# Patient Record
Sex: MALE | Race: WHITE | NOT HISPANIC OR LATINO | Employment: FULL TIME | URBAN - METROPOLITAN AREA
[De-identification: names, ages, dates, MRNs, and addresses within clinical notes are randomized per-mention and may not be internally consistent; named-entity substitution may affect disease eponyms.]

---

## 2017-10-30 ENCOUNTER — ALLSCRIPTS OFFICE VISIT (OUTPATIENT)
Dept: OTHER | Facility: OTHER | Age: 47
End: 2017-10-30

## 2017-10-30 LAB
BILIRUB UR QL STRIP: NORMAL
CLARITY UR: NORMAL
COLOR UR: YELLOW
GLUCOSE (HISTORICAL): NORMAL
HGB UR QL STRIP.AUTO: NORMAL
KETONES UR STRIP-MCNC: NORMAL MG/DL
LEUKOCYTE ESTERASE UR QL STRIP: NORMAL
NITRITE UR QL STRIP: NORMAL
PH UR STRIP.AUTO: 7 [PH]
PROT UR STRIP-MCNC: NORMAL MG/DL
SP GR UR STRIP.AUTO: 1.02
UROBILINOGEN UR QL STRIP.AUTO: 0.2

## 2018-01-04 ENCOUNTER — GENERIC CONVERSION - ENCOUNTER (OUTPATIENT)
Dept: OTHER | Facility: OTHER | Age: 48
End: 2018-01-04

## 2018-01-11 NOTE — RESULT NOTES
Message   please call patient and let him know his biopsy result are benign  should keep the appointment with Mckenna to discuss change in bowel habits  Verified Results  (1) TISSUE EXAM [id number] 01:55PM Vinita Grimes     Test Name Result Flag Reference   LAB AP CASE REPORT (Report)     Surgical Pathology Report             Case: [id number]                   Authorizing Provider: Alexi Henderson MD       Collected:      02/25/2016 1355        Ordering Location:   [address] [place] [address]    Received:      02/26/2016 [address] [facility]                            Pathologist:      Leonora Haskins MD                              Specimen:  Large Intestine, Transverse Colon, transverse colon polyp 4mm cold bx   LAB AP FINAL DIAGNOSIS (Report)     A  Large Intestine, Transverse Colon, transverse colon polyp 4mm cold bx:        - Tubular adenoma in 2 of multiple tissue fragments        - Additional fragments of colonic mucosa with no significant   pathologic alteration         - No high-grade dysplasia or malignancy is identified  LAB AP SURGICAL ADDITIONAL INFORMATION (Report)     These tests were developed and their performance characteristics   determined by [address] [place] [address] or [facility]  They may not be cleared or approved by the [place]  Food and   Drug Administration  The FDA has determined that such clearance or   approval is not necessary  These tests are used for clinical purposes  They should not be regarded as investigational or for research  This   laboratory has been approved by CLIA 88, designated as a high-complexity   laboratory and is qualified to perform these tests  LAB AP GROSS DESCRIPTION (Report)     A  The specimen is received in formalin, labeled with the patient's name   and hospital number, and is designated transverse colon polyp, 4   millimeter   The specimen consists of 5 tan-pink soft tissue fragments   ranging from 0 2 centimeters to 0 5 centimeters in greatest dimension  Entirely submitted  One cassette  Note: The estimated total formalin fixation time based upon information   provided by the submitting clinician and the standard processing schedule   is 38 5 hours  MAS   LAB AP CLINICAL INFORMATION      Change in bowel habits   [R19 4]

## 2018-01-14 VITALS
WEIGHT: 184 LBS | SYSTOLIC BLOOD PRESSURE: 127 MMHG | DIASTOLIC BLOOD PRESSURE: 79 MMHG | BODY MASS INDEX: 24.39 KG/M2 | HEIGHT: 73 IN

## 2018-01-16 NOTE — MISCELLANEOUS
Message  Return to work or school:   Mor Pickett is under my professional care   He was seen in my office on 2/25/16   He is able to return to work on  2/26/16            Signatures   Electronically signed by : Alexi Henderson MD; Feb 25 2016  2:18PM EST                       (Author)

## 2018-01-23 NOTE — MISCELLANEOUS
Message  GI Reminder Recall [address] [place] [address] [address]:   Date: 01/04/2018   Dear Alda Bruce:     Review of our records shows you are due for the following: Colonoscopy  Our records indicate that you are due at this time to have a follow-up examination for a colonoscopy  As you now, these tests are done to prevent colon cancer, a very common disease in the [place] and responsible for the thousands of patient deaths each year  We at [facility] are concerned for your health, and would very much appreciate you getting in touch with us at your earliest convenience, Again, this examination is vital to your proper health maintenance and for the prevention of cancer  Please call the following office to schedule your appointment:   [address], [address], [place], [address] [facility] [phone]  We look forward to hearing from you!      Sincerely,     [facility]      Signatures   Electronically signed by : Jose Antonio Jalloh, ; Jan 4 2018  2:33PM EST                       (Author)

## 2018-04-20 ENCOUNTER — TELEPHONE (OUTPATIENT)
Dept: GASTROENTEROLOGY | Facility: CLINIC | Age: 48
End: 2018-04-20

## 2018-04-25 DIAGNOSIS — R19.8 CHANGE IN BOWEL FUNCTION: Primary | ICD-10-CM

## 2018-04-25 PROBLEM — Z86.010 HISTORY OF COLON POLYPS: Status: ACTIVE | Noted: 2018-04-25

## 2018-04-25 PROBLEM — Z86.0100 HISTORY OF COLON POLYPS: Status: ACTIVE | Noted: 2018-04-25

## 2018-04-25 NOTE — TELEPHONE ENCOUNTER
Please send suprep to pharmacy on file   Thanks       Pt is sched for repeat colon with dr Familia Ramsey on 05/07/2018 at [facility] pt instructions to suprep and procedure pt is aware he will get a call the day before with time  Email: [email or web address]

## 2018-05-04 ENCOUNTER — ANESTHESIA EVENT (OUTPATIENT)
Dept: GASTROENTEROLOGY | Facility: MEDICAL CENTER | Age: 48
End: 2018-05-04
Payer: COMMERCIAL

## 2018-05-07 ENCOUNTER — ANESTHESIA (OUTPATIENT)
Dept: GASTROENTEROLOGY | Facility: MEDICAL CENTER | Age: 48
End: 2018-05-07
Payer: COMMERCIAL

## 2018-05-07 ENCOUNTER — HOSPITAL ENCOUNTER (OUTPATIENT)
Facility: MEDICAL CENTER | Age: 48
Setting detail: OUTPATIENT SURGERY
Discharge: HOME | End: 2018-05-07
Attending: INTERNAL MEDICINE | Admitting: INTERNAL MEDICINE
Payer: COMMERCIAL

## 2018-05-07 VITALS
HEIGHT: 73 IN | DIASTOLIC BLOOD PRESSURE: 73 MMHG | WEIGHT: 187 LBS | RESPIRATION RATE: 18 BRPM | TEMPERATURE: 97.7 F | BODY MASS INDEX: 24.78 KG/M2 | SYSTOLIC BLOOD PRESSURE: 112 MMHG | OXYGEN SATURATION: 97 % | HEART RATE: 63 BPM

## 2018-05-07 DIAGNOSIS — Z86.010 HISTORY OF COLON POLYPS: ICD-10-CM

## 2018-05-07 PROCEDURE — 45380 COLONOSCOPY AND BIOPSY: CPT | Performed by: INTERNAL MEDICINE

## 2018-05-07 PROCEDURE — 88305 TISSUE EXAM BY PATHOLOGIST: CPT | Performed by: PATHOLOGY

## 2018-05-07 RX ORDER — EMTRICITABINE AND TENOFOVIR DISOPROXIL FUMARATE 100; 150 MG/1; MG/1
TABLET, FILM COATED ORAL
COMMUNITY

## 2018-05-07 RX ORDER — PROPOFOL 10 MG/ML
INJECTION, EMULSION INTRAVENOUS AS NEEDED
Status: DISCONTINUED | OUTPATIENT
Start: 2018-05-07 | End: 2018-05-07 | Stop reason: SURG

## 2018-05-07 RX ORDER — LIDOCAINE HYDROCHLORIDE 20 MG/ML
INJECTION, SOLUTION EPIDURAL; INFILTRATION; INTRACAUDAL; PERINEURAL AS NEEDED
Status: DISCONTINUED | OUTPATIENT
Start: 2018-05-07 | End: 2018-05-07 | Stop reason: SURG

## 2018-05-07 RX ORDER — SODIUM CHLORIDE 9 MG/ML
125 INJECTION, SOLUTION INTRAVENOUS CONTINUOUS
Status: DISCONTINUED | OUTPATIENT
Start: 2018-05-07 | End: 2018-05-07 | Stop reason: HOSPADM

## 2018-05-07 RX ORDER — PROPOFOL 10 MG/ML
INJECTION, EMULSION INTRAVENOUS CONTINUOUS PRN
Status: DISCONTINUED | OUTPATIENT
Start: 2018-05-07 | End: 2018-05-07 | Stop reason: SURG

## 2018-05-07 RX ADMIN — LIDOCAINE HYDROCHLORIDE 60 MG: 20 INJECTION, SOLUTION EPIDURAL; INFILTRATION; INTRACAUDAL; PERINEURAL at 14:29

## 2018-05-07 RX ADMIN — SODIUM CHLORIDE 125 ML/HR: 0.9 INJECTION, SOLUTION INTRAVENOUS at 13:59

## 2018-05-07 RX ADMIN — PROPOFOL 100 MG: 10 INJECTION, EMULSION INTRAVENOUS at 14:29

## 2018-05-07 RX ADMIN — PROPOFOL 160 MCG/KG/MIN: 10 INJECTION, EMULSION INTRAVENOUS at 14:29

## 2018-05-07 NOTE — ANESTHESIA PREPROCEDURE EVALUATION
Review of Systems/Medical History  Patient summary reviewed  Chart reviewed      Cardiovascular  Negative cardio ROS    Pulmonary  Smoker ex-smoker  ,        GI/Hepatic    Bowel prep            Endo/Other  Negative endo/other ROS      GYN       Hematology  Negative hematology ROS      Musculoskeletal  Negative musculoskeletal ROS        Neurology  Negative neurology ROS      Psychology   Negative psychology ROS              Physical Exam    Airway    Mallampati score: I  TM Distance: <3 FB       Dental   No notable dental hx     Cardiovascular  Comment: Negative ROS, Rhythm: regular, Rate: normal, Cardiovascular exam normal    Pulmonary  Pulmonary exam normal     Other Findings        Anesthesia Plan  ASA Score- 2     Anesthesia Type- IV sedation with anesthesia with ASA Monitors  Additional Monitors:   Airway Plan:         Plan Factors- Patient instructed to abstain from smoking on day of procedure  Patient did not smoke on day of surgery  Induction- intravenous  Postoperative Plan-     Informed Consent- Anesthetic plan and risks discussed with patient

## 2018-05-07 NOTE — DISCHARGE INSTRUCTIONS
Colonoscopy   WHAT YOU NEED TO KNOW:   A colonoscopy is a procedure to examine the inside of your colon (intestine) with a scope  Polyps or tissue growths may have been removed during your colonoscopy  It is normal to feel bloated and to have some abdominal discomfort  You should be passing gas  If you have hemorrhoids or you had polyps removed, you may have a small amount of bleeding  DISCHARGE INSTRUCTIONS:   Seek care immediately if:   · You have a large amount of bright red blood in your bowel movements  · Your abdomen is hard and firm and you have severe pain  · You have sudden trouble breathing  Contact your healthcare provider if:   · You develop a rash or hives  · You have a fever within 24 hours of your procedure  · You have not had a bowel movement for 3 days after your procedure  · You have questions or concerns about your condition or care  Activity:   · Do not lift, strain, or run  for 3 days after your procedure  · Rest after your procedure  You have been given medicine to relax you  Do not  drive or make important decisions until the day after your procedure  Return to your normal activity as directed  · Relieve gas and discomfort from bloating  by lying on your right side with a heating pad on your abdomen  You may need to take short walks to help the gas move out  Eat small meals until bloating is relieved  If you had polyps removed: For 7 days after your procedure:  · Do not  take aspirin  · Do not  go on long car rides  Help prevent constipation:   · Eat a variety of healthy foods  Healthy foods include fruit, vegetables, whole-grain breads, low-fat dairy products, beans, lean meat, and fish  Ask if you need to be on a special diet  Your healthcare provider may recommend that you eat high-fiber foods such as cooked beans  Fiber helps you have regular bowel movements  · Drink liquids as directed    Adults should drink between 9 and 13 eight-ounce cups of liquid every day  Ask what amount is best for you  For most people, good liquids to drink are water, juice, and milk  · Exercise as directed  Talk to your healthcare provider about the best exercise plan for you  Exercise can help prevent constipation, decrease your blood pressure and improve your health  Follow up with your healthcare provider as directed:  Write down your questions so you remember to ask them during your visits  © 2017 [employer] [employer] Information is for End User's use only and may not be sold, redistributed or otherwise used for commercial purposes  All illustrations and images included in CareNotes® are the copyrighted property of A D A M , Inc  or Aditya Pedersen  The above information is an  only  It is not intended as medical advice for individual conditions or treatments  Talk to your doctor, nurse or pharmacist before following any medical regimen to see if it is safe and effective for you  Colorectal Polyps   WHAT YOU NEED TO KNOW:   Colorectal polyps are small growths of tissue in the lining of the colon and rectum  Most polyps are hyperplastic polyps and are usually benign (noncancerous)  Certain types of polyps, called adenomatous polyps, may turn into cancer  DISCHARGE INSTRUCTIONS:   Follow up with your healthcare provider or gastroenterologist as directed: You may need to return for more tests, such as another colonoscopy  Write down your questions so you remember to ask them during your visits  Reduce your risk for colorectal polyps:   · Eat a variety of healthy foods:  Healthy foods include fruit, vegetables, whole-grain breads, low-fat dairy products, beans, lean meat, and fish  Ask if you need to be on a special diet  · Maintain a healthy weight:  Ask your healthcare provider if you need to lose weight and how much you need to lose   Ask for help with a weight loss program     · Exercise:  Begin to exercise slowly and do more as you get stronger  Talk with your healthcare provider before you start an exercise program      · Limit alcohol:  Your risk for polyps increases the more you drink  · Do not smoke: If you smoke, it is never too late to quit  Ask for information about how to stop  For support and more information:   · Zuleyka Kuhn ([facility])  [address] , [place] [address]  Phone: [phone]  Web Address: www digestive  niddk nih gov  Contact your healthcare provider or gastroenterologist if:   · You have a fever  · You have chills, a cough, or feel weak and achy  · You have abdominal pain that does not go away or gets worse after you take medicine  · Your abdomen is swollen  · You are losing weight without trying  · You have questions or concerns about your condition or care  Seek care immediately or call 911 if:   · You have sudden shortness of breath  · You have a fast heart rate, fast breathing, or are too dizzy to stand up  · You have severe abdominal pain  · You see blood in your bowel movement  © 2017 [facility] [facility] Information is for End User's use only and may not be sold, redistributed or otherwise used for commercial purposes  All illustrations and images included in CareNotes® are the copyrighted property of A D A M , Inc  or [address]  The above information is an  only  It is not intended as medical advice for individual conditions or treatments  Talk to your doctor, nurse or pharmacist before following any medical regimen to see if it is safe and effective for you

## 2018-05-07 NOTE — OP NOTE
**** GI/ENDOSCOPY REPORT ****     PATIENT NAME: Promise Ortiz ------ VISIT ID:  Patient ID: [id number]   YOB: 1970     INTRODUCTION: Colonoscopy - A 41 male patient presents for an outpatient   Colonoscopy at [address] [place] [address]  PREVIOUS COLONOSCOPY:     INDICATIONS: Surveillance  CONSENT:  The benefits, risks, and alternatives to the procedure were   discussed and informed consent was obtained from the patient  PREPARATION: EKG, pulse, pulse oximetry and blood pressure were monitored   throughout the procedure  The patient was identified by myself both   verbally and by visual inspection of ID band  Airway Assessment   Classification: Airway class 2 - Visualization of the soft palate, fauces   and uvula  ASA Classification: See anesthesia record  MEDICATIONS: Anesthesia-check records MAC anesthesia  PROCEDURE:  The endoscope was passed without difficulty through the anus   under direct visualization and advanced to the cecum, confirmed by   appendiceal orifice and ileocecal valve  The scope was withdrawn and the   mucosa was carefully examined  The quality of the preparation was good  intubation time: 4 minutes  withdrawl 11 minutes  RECTAL EXAM: Normal rectal exam      FINDINGS:  A single diminutive polyp was found in the transverse colon  The polyp was completely removed by cold biopsy polypectomy  Otherwise,   the colon appeared to be normal  Small hemorrhoids during retroflex  COMPLICATIONS: There were no complications  IMPRESSIONS: A single diminutive polyp found in the transverse colon;   removed by cold biopsy polypectomy  RECOMMENDATIONS: Colonoscopy recommended in 5 years  follow up biopsy   result  ESTIMATED BLOOD LOSS:     PATHOLOGY SPECIMENS: Completely removed by cold biopsy polypectomy       PROCEDURE CODES:     ICD-9 Codes: 211 3 Benign neoplasm of colon     ICD-10 Codes: K63 5 Polyp of colon     PERFORMED BY:   DANIELLE Ortega  on  Version 1, electronically signed by DANIELLE Gordon  on 05/07/2018 at   14:51

## 2018-05-11 ENCOUNTER — TELEPHONE (OUTPATIENT)
Dept: GASTROENTEROLOGY | Facility: MEDICAL CENTER | Age: 48
End: 2018-05-11

## 2018-05-11 NOTE — TELEPHONE ENCOUNTER
----- Message from Evelena Phalen, MD sent at 5/10/2018  2:32 PM EDT -----  Polyp removed was precancerous  Repeat colonoscopy in 5 years  Please enter reminder

## 2020-10-22 ENCOUNTER — NURSE TRIAGE (OUTPATIENT)
Dept: OTHER | Facility: OTHER | Age: 50
End: 2020-10-22

## 2020-10-22 DIAGNOSIS — Z11.59 SPECIAL SCREENING EXAMINATION FOR UNSPECIFIED VIRAL DISEASE: Primary | ICD-10-CM

## 2020-10-23 DIAGNOSIS — Z11.59 SPECIAL SCREENING EXAMINATION FOR UNSPECIFIED VIRAL DISEASE: ICD-10-CM

## 2020-10-23 PROCEDURE — U0003 INFECTIOUS AGENT DETECTION BY NUCLEIC ACID (DNA OR RNA); SEVERE ACUTE RESPIRATORY SYNDROME CORONAVIRUS 2 (SARS-COV-2) (CORONAVIRUS DISEASE [COVID-19]), AMPLIFIED PROBE TECHNIQUE, MAKING USE OF HIGH THROUGHPUT TECHNOLOGIES AS DESCRIBED BY CMS-2020-01-R: HCPCS | Performed by: FAMILY MEDICINE

## 2020-10-24 LAB — SARS-COV-2 RNA SPEC QL NAA+PROBE: NOT DETECTED

## 2023-04-30 LAB — PSA SERPL-MCNC: 4.77 NG/ML

## 2023-05-03 ENCOUNTER — TELEPHONE (OUTPATIENT)
Dept: OTHER | Facility: OTHER | Age: 53
End: 2023-05-03

## 2023-05-03 DIAGNOSIS — R97.20 ELEVATED PSA: Primary | ICD-10-CM

## 2023-05-03 NOTE — TELEPHONE ENCOUNTER
PSA remains elevated  Per last office visit, MRI was ordered  Please let patient know to go for this and office follow up to discuss results once completed   Thanks

## 2023-05-03 NOTE — TELEPHONE ENCOUNTER
Spoke with patient and relayed provider's plan  He was provided with central scheduling number to schedule MRI prostate  Advised him to then call here and schedule an appt about a week after the MRI to review the results  Patient verbalized understanding

## 2023-05-03 NOTE — TELEPHONE ENCOUNTER
Patient calling regarding lab results  It does not appear that a provider advised on them yet  Please call patient regarding results

## 2023-05-05 ENCOUNTER — TELEPHONE (OUTPATIENT)
Dept: OTHER | Facility: OTHER | Age: 53
End: 2023-05-05

## 2023-05-05 NOTE — TELEPHONE ENCOUNTER
May 5, 2023       CLARISSE    8:55 AM  Helio Lynch        8:57 AM  Note   Patient called today regarding his MRI Appointment is May 16 at 4:15 pm

## 2023-05-16 ENCOUNTER — HOSPITAL ENCOUNTER (OUTPATIENT)
Facility: MEDICAL CENTER | Age: 53
Discharge: HOME | End: 2023-05-16

## 2023-05-16 DIAGNOSIS — R97.20 ELEVATED PSA: ICD-10-CM

## 2023-05-16 RX ADMIN — GADOBUTROL 10 ML: 604.72 INJECTION INTRAVENOUS at 16:52

## 2023-05-18 ENCOUNTER — TELEPHONE (OUTPATIENT)
Dept: OTHER | Facility: OTHER | Age: 53
End: 2023-05-18

## 2023-05-18 NOTE — TELEPHONE ENCOUNTER
Pt received his results on Mychart and is anxious  He wants to know if the Dr has any openings to discuss his results today or tomorrow instead of his apt on 05/22  Please call and advise

## 2023-05-22 ENCOUNTER — OFFICE VISIT (OUTPATIENT)
Dept: UROLOGY | Facility: CLINIC | Age: 53
End: 2023-05-22

## 2023-05-22 VITALS
OXYGEN SATURATION: 94 % | HEART RATE: 108 BPM | HEIGHT: 73 IN | SYSTOLIC BLOOD PRESSURE: 120 MMHG | BODY MASS INDEX: 29.29 KG/M2 | WEIGHT: 221 LBS | DIASTOLIC BLOOD PRESSURE: 80 MMHG

## 2023-05-22 DIAGNOSIS — R97.20 ELEVATED PSA: Primary | ICD-10-CM

## 2023-05-22 DIAGNOSIS — R35.1 NOCTURIA: ICD-10-CM

## 2023-05-22 NOTE — ASSESSMENT & PLAN NOTE
The patient is primarily bothered by nocturia but is having large volume voids each time he goes overnight which suggests he may have nocturnal polyuria or may be related to another issue such as sleep apnea  He does snore per his partner  I asked him to get a sleep study done through his PCP    If this is unremarkable then would consider anticholinergic medication

## 2023-05-22 NOTE — PROGRESS NOTES
Assessment/Plan:    Nocturia  The patient is primarily bothered by nocturia but is having large volume voids each time he goes overnight which suggests he may have nocturnal polyuria or may be related to another issue such as sleep apnea  He does snore per his partner  I asked him to get a sleep study done through his PCP  If this is unremarkable then would consider anticholinergic medication    Elevated PSA  We discussed the patient's mildly elevated PSA and MRI showing a PI-RADS 3 lesion  Discussed implications of this  Given his young age I think it is reasonable to move forward MRI guided fusion biopsy but not unreasonable to repeat a PSA  He wants to move forward with MRI guided fusion biopsy  He is not on blood thinners  I will see him back after biopsy to go over results  Subjective:      Patient ID: Michelle Lira is a 50 y o  male  HPI    Michelle Lira is a 50 y o  male with elevated PSA  The patient's PSA was 4 7 in April 2023  He apparently had an outside transrectal prostate ultrasound showing a 36 cc prostate with no suspicious masses  He elected for an MRI of the prostate which showed a PI-RADS 3 lesion at the right peripheral zone near the apex  JANE at prior visit showed asymmetry in the right side  He complains of urinary frequency every hour to hour and a half during the day and up to 4-5 times per night  He was put on Levaquin and tamsulosin by his primary care provider  He reports no difference in his symptoms with the Flomax  He continues to report issues primarily with nocturia  He denies any family history of prostate bladder or kidney diseases  Past Surgical History:   Procedure Laterality Date   • COLONOSCOPY N/A 2/25/2016    Procedure: COLONOSCOPY;  Surgeon: Cralee Hale MD;  Location: [facility]; Service:    • COLONOSCOPY N/A 5/7/2018    Procedure: COLONOSCOPY;  Surgeon: Carlee Hale MD;  Location: [facility];   Service: Gastroenterology "  • HERNIA REPAIR      infant        Past Medical History:   Diagnosis Date   • Allergic rhinitis    • Colon polyps    • Diarrhea     along with difficulty evacuating stool at times  pt states it is like something clamps down during evacuation and cant release        AUA SYMPTOM SCORE    Flowsheet Row Most Recent Value   AUA SYMPTOM SCORE    How often have you had a sensation of not emptying your bladder completely after you finished urinating? 5 (P)     How often have you had to urinate again less than two hours after you finished urinating? 3 (P)     How often have you found you stopped and started again several times when you urinate? 4 (P)     How often have you found it difficult to postpone urination? 4 (P)     How often have you had a weak urinary stream? 0 (P)     How often have you had to push or strain to begin urination? 0 (P)     How many times did you most typically get up to urinate from the time you went to bed at night until the time you got up in the morning? 3 (P)     Quality of Life: If you were to spend the rest of your life with your urinary condition just the way it is now, how would you feel about that? 4 (P)     AUA SYMPTOM SCORE 19 (P)            Review of Systems   Constitutional: Negative for chills and fever  HENT: Negative for ear pain and sore throat  Eyes: Negative for pain and visual disturbance  Respiratory: Negative for cough and shortness of breath  Cardiovascular: Negative for chest pain and palpitations  Gastrointestinal: Negative for abdominal pain and vomiting  Genitourinary: Negative for dysuria and hematuria  Musculoskeletal: Negative for arthralgias and back pain  Skin: Negative for color change and rash  Neurological: Negative for seizures and syncope  All other systems reviewed and are negative          Objective:      /80 (BP Location: Left arm, Patient Position: Sitting, Cuff Size: Standard)   Pulse (!) 108   Ht 6' 1\" (1 854 m)   Wt 100 kg " (221 lb)   SpO2 94%   BMI 29 16 kg/m²     Lab Results   Component Value Date    PSA 4 77 (H) 04/29/2023          Physical Exam  Vitals reviewed  Constitutional:       Appearance: Normal appearance  He is normal weight  HENT:      Head: Normocephalic and atraumatic  Eyes:      Pupils: Pupils are equal, round, and reactive to light  Abdominal:      General: Abdomen is flat  Neurological:      General: No focal deficit present  Mental Status: He is alert and oriented to person, place, and time  Psychiatric:         Mood and Affect: Mood normal          Thought Content: Thought content normal            Narrative & Impression   MULTIPARAMETRIC MRI OF THE PROSTATE WITH AND WITHOUT CONTRAST-WITH 3-D POSTPROCESSING      INDICATION:   R97 20: Elevated prostate specific antigen (PSA)  61-year-old male with elevated PSA      COMPARISON: Transrectal prostate ultrasound from April 10, 2023      PSA LEVEL: 4 77 ng/ml  (4/29/2023)  PRIORS: None  PRIOR BIOPSY DATE: No prior biopsy  BIOPSY RESULTS: Not applicable      TECHNIQUE: The following pulse sequences were obtained:  Small field-of-view axial T1-weighted and multiplanar T2-weighted images; DWI axial and ADC map; large field of view axial T2 weighted images; T1w in-phase and opposed-phase axials of entire pelvis   and dynamic 3D T1w axial before and during IV contrast injection  Imaging performed on 3 0T MRI     Prostate gland boundaries were segmented using advanced 3D post-processing on an independent workstation ([employer]) with active physician participation      CONTRAST:  Gadobutrol (Gadavist) 10 mL of Gadobutrol injection (SINGLE-DOSE)     TECHNICAL LIMITATIONS: None      FINDINGS:     PROSTATE:  Size: 4 9 x 4 2 x 4 1 cm  Prostate volume: 48 mL  PSA density: 0 10 ng/mL2  POST-BIOPSY HEMORRHAGE: Not applicable  PERIPHERAL ZONE: Lesion at the apex described below  TRANSITION ZONE: Moderate BPH  No atypical nodules   No suspicious T2 hypointense foci   CENTRAL ZONE: Normal   ANTERIOR FIBROMUSCULAR STROMA:  Normal      FOCAL LESIONS:     Lesion: 1  Size: 1 1 x 0 9 x 0 6 cm, 0 4 ml  Location: Right anterior peripheral zone at the apex  T2-weighted images: Score 4: Circumscribed, homogeneous moderate hypointense focus/mass confined to prostate and less than 1 5 cm in greatest dimension  Diffusion-weighted images: Score 3: Focal (discrete and different from the background) hypointense on ADC and/or focal hyperintense on high b-value DWI; maybe markedly hypointese on ADC or markedly hyperintese on high b-value DWI, but not both  Dynamic post-contrast images: (-) Lesion that does not enhance early compared to the surrounding prostate or enhances diffusely so that the margins of the enhancing area do not correspond to a finding on T2-weighted images and/or DWI  PI-RADS Assessment Category: 3, Intermediate (presence of clinically significant cancer equivocal)  Extra-prostatic extension (EPE): Abuts capsule without visualized EPE      SEMINAL VESICLES:  Unremarkable     URINARY BLADDER: Unremarkable      LYMPH NODES: No pelvic lymphadenopathy      BONES: Normal marrow signal  No suspicious bony lesion      OTHER:  Vessels:  Normal   Imaged bowel: Sigmoid diverticulosis without evidence of diverticulitis  Additional findings:  None        IMPRESSION:     1  PI-RADSv2 1 Category 3 - Intermediate (the presence of clinically significant cancer is equivocal)  1 1 x 0 9 x 0 6 cm lesion in the right anterior peripheral zone at apex  2  No extraprostatic tumor, seminal vesicle invasion, pelvic lymphadenopathy, or pelvic osseous metastatic disease  3  Moderate BPH  Calculated prostate volume of 48 mL             Orders  No orders of the defined types were placed in this encounter

## 2023-05-22 NOTE — ASSESSMENT & PLAN NOTE
We discussed the patient's mildly elevated PSA and MRI showing a PI-RADS 3 lesion  Discussed implications of this  Given his young age I think it is reasonable to move forward MRI guided fusion biopsy but not unreasonable to repeat a PSA  He wants to move forward with MRI guided fusion biopsy  He is not on blood thinners  I will see him back after biopsy to go over results

## 2023-05-23 ENCOUNTER — TELEPHONE (OUTPATIENT)
Dept: UROLOGY | Facility: MEDICAL CENTER | Age: 53
End: 2023-05-23

## 2023-05-23 NOTE — TELEPHONE ENCOUNTER
I called patient regarding scheduling Biopsy  I advised that the schedule is currently frozen but I expect that to change in the next week or so  I will contact him once the schedule is available

## 2023-07-06 ENCOUNTER — ANESTHESIA EVENT (OUTPATIENT)
Dept: PERIOP | Facility: AMBULARY SURGERY CENTER | Age: 53
End: 2023-07-06
Payer: COMMERCIAL

## 2023-07-08 ENCOUNTER — APPOINTMENT (OUTPATIENT)
Dept: LAB | Facility: CLINIC | Age: 53
End: 2023-07-08
Payer: COMMERCIAL

## 2023-07-08 DIAGNOSIS — R97.20 ELEVATED PROSTATE SPECIFIC ANTIGEN (PSA): ICD-10-CM

## 2023-07-08 LAB
ALBUMIN SERPL BCP-MCNC: 4.5 G/DL (ref 3.5–5)
ALP SERPL-CCNC: 82 U/L (ref 34–104)
ALT SERPL W P-5'-P-CCNC: 48 U/L (ref 7–52)
ANION GAP SERPL CALCULATED.3IONS-SCNC: 8 MMOL/L
AST SERPL W P-5'-P-CCNC: 26 U/L (ref 13–39)
BASOPHILS # BLD AUTO: 0.05 THOUSANDS/ÂΜL (ref 0–0.1)
BASOPHILS NFR BLD AUTO: 1 % (ref 0–1)
BILIRUB SERPL-MCNC: 0.58 MG/DL (ref 0.2–1)
BUN SERPL-MCNC: 22 MG/DL (ref 5–25)
CALCIUM SERPL-MCNC: 9.5 MG/DL (ref 8.4–10.2)
CHLORIDE SERPL-SCNC: 105 MMOL/L (ref 96–108)
CO2 SERPL-SCNC: 26 MMOL/L (ref 21–32)
CREAT SERPL-MCNC: 0.96 MG/DL (ref 0.6–1.3)
EOSINOPHIL # BLD AUTO: 0.21 THOUSAND/ÂΜL (ref 0–0.61)
EOSINOPHIL NFR BLD AUTO: 4 % (ref 0–6)
ERYTHROCYTE [DISTWIDTH] IN BLOOD BY AUTOMATED COUNT: 13.4 % (ref 11.6–15.1)
GFR SERPL CREATININE-BSD FRML MDRD: 91 ML/MIN/1.73SQ M
GLUCOSE P FAST SERPL-MCNC: 109 MG/DL (ref 65–99)
HCT VFR BLD AUTO: 49.5 % (ref 36.5–49.3)
HGB BLD-MCNC: 16.4 G/DL (ref 12–17)
IMM GRANULOCYTES # BLD AUTO: 0.03 THOUSAND/UL (ref 0–0.2)
IMM GRANULOCYTES NFR BLD AUTO: 1 % (ref 0–2)
LYMPHOCYTES # BLD AUTO: 1.46 THOUSANDS/ÂΜL (ref 0.6–4.47)
LYMPHOCYTES NFR BLD AUTO: 29 % (ref 14–44)
MCH RBC QN AUTO: 29.5 PG (ref 26.8–34.3)
MCHC RBC AUTO-ENTMCNC: 33.1 G/DL (ref 31.4–37.4)
MCV RBC AUTO: 89 FL (ref 82–98)
MONOCYTES # BLD AUTO: 0.51 THOUSAND/ÂΜL (ref 0.17–1.22)
MONOCYTES NFR BLD AUTO: 10 % (ref 4–12)
NEUTROPHILS # BLD AUTO: 2.84 THOUSANDS/ÂΜL (ref 1.85–7.62)
NEUTS SEG NFR BLD AUTO: 55 % (ref 43–75)
NRBC BLD AUTO-RTO: 0 /100 WBCS
PLATELET # BLD AUTO: 291 THOUSANDS/UL (ref 149–390)
PMV BLD AUTO: 9.5 FL (ref 8.9–12.7)
POTASSIUM SERPL-SCNC: 4.2 MMOL/L (ref 3.5–5.3)
PROT SERPL-MCNC: 7.6 G/DL (ref 6.4–8.4)
RBC # BLD AUTO: 5.56 MILLION/UL (ref 3.88–5.62)
SODIUM SERPL-SCNC: 139 MMOL/L (ref 135–147)
WBC # BLD AUTO: 5.1 THOUSAND/UL (ref 4.31–10.16)

## 2023-07-08 PROCEDURE — 87086 URINE CULTURE/COLONY COUNT: CPT

## 2023-07-08 PROCEDURE — 80053 COMPREHEN METABOLIC PANEL: CPT

## 2023-07-08 PROCEDURE — 85025 COMPLETE CBC W/AUTO DIFF WBC: CPT

## 2023-07-08 PROCEDURE — 36415 COLL VENOUS BLD VENIPUNCTURE: CPT

## 2023-07-09 LAB — BACTERIA UR CULT: NORMAL

## 2023-07-19 RX ORDER — ACETAMINOPHEN 325 MG/1
650 TABLET ORAL 2 TIMES DAILY
COMMUNITY

## 2023-07-19 NOTE — PRE-PROCEDURE INSTRUCTIONS
Pre-Surgery Instructions:   Medication Instructions   • acetaminophen (TYLENOL) 325 mg tablet Hold day of surgery. • aspirin-acetaminophen-caffeine ([facility]) 123-500-99 MG per tablet Stop taking 2 days prior to surgery. • cetirizine (ZyrTEC) 10 mg tablet Hold day of surgery. Pt verbalized understanding of shower and med instructions. Pt instructed to stop nsaids and supplements prior to surgery.

## 2023-07-20 ENCOUNTER — ANESTHESIA (OUTPATIENT)
Dept: PERIOP | Facility: AMBULARY SURGERY CENTER | Age: 53
End: 2023-07-20
Payer: COMMERCIAL

## 2023-07-20 ENCOUNTER — HOSPITAL ENCOUNTER (OUTPATIENT)
Facility: AMBULARY SURGERY CENTER | Age: 53
Setting detail: OUTPATIENT SURGERY
Discharge: HOME | End: 2023-07-20
Attending: UROLOGY | Admitting: UROLOGY
Payer: COMMERCIAL

## 2023-07-20 VITALS
HEART RATE: 55 BPM | OXYGEN SATURATION: 95 % | SYSTOLIC BLOOD PRESSURE: 130 MMHG | RESPIRATION RATE: 16 BRPM | DIASTOLIC BLOOD PRESSURE: 81 MMHG | TEMPERATURE: 96.6 F

## 2023-07-20 DIAGNOSIS — R97.20 ELEVATED PROSTATE SPECIFIC ANTIGEN (PSA): ICD-10-CM

## 2023-07-20 PROCEDURE — G0416 PROSTATE BIOPSY, ANY MTHD: HCPCS | Performed by: PATHOLOGY

## 2023-07-20 PROCEDURE — 99024 POSTOP FOLLOW-UP VISIT: CPT | Performed by: UROLOGY

## 2023-07-20 PROCEDURE — 88344 IMHCHEM/IMCYTCHM EA MLT ANTB: CPT | Performed by: PATHOLOGY

## 2023-07-20 PROCEDURE — 76942 ECHO GUIDE FOR BIOPSY: CPT | Performed by: UROLOGY

## 2023-07-20 PROCEDURE — 55700 PR PROSTATE NEEDLE BIOPSY ANY APPROACH: CPT | Performed by: UROLOGY

## 2023-07-20 RX ORDER — MEPERIDINE HYDROCHLORIDE 25 MG/ML
12.5 INJECTION INTRAMUSCULAR; INTRAVENOUS; SUBCUTANEOUS
Status: DISCONTINUED | OUTPATIENT
Start: 2023-07-20 | End: 2023-07-20 | Stop reason: HOSPADM

## 2023-07-20 RX ORDER — MAGNESIUM HYDROXIDE 1200 MG/15ML
LIQUID ORAL AS NEEDED
Status: DISCONTINUED | OUTPATIENT
Start: 2023-07-20 | End: 2023-07-20 | Stop reason: HOSPADM

## 2023-07-20 RX ORDER — ALBUTEROL SULFATE 2.5 MG/3ML
2.5 SOLUTION RESPIRATORY (INHALATION) ONCE AS NEEDED
Status: DISCONTINUED | OUTPATIENT
Start: 2023-07-20 | End: 2023-07-20 | Stop reason: HOSPADM

## 2023-07-20 RX ORDER — PROPOFOL 10 MG/ML
INJECTION, EMULSION INTRAVENOUS CONTINUOUS PRN
Status: DISCONTINUED | OUTPATIENT
Start: 2023-07-20 | End: 2023-07-20

## 2023-07-20 RX ORDER — CEFAZOLIN SODIUM 2 G/50ML
2000 SOLUTION INTRAVENOUS ONCE
Status: COMPLETED | OUTPATIENT
Start: 2023-07-20 | End: 2023-07-20

## 2023-07-20 RX ORDER — HYDROMORPHONE HCL/PF 1 MG/ML
0.5 SYRINGE (ML) INJECTION
Status: DISCONTINUED | OUTPATIENT
Start: 2023-07-20 | End: 2023-07-20 | Stop reason: HOSPADM

## 2023-07-20 RX ORDER — DEXMEDETOMIDINE HYDROCHLORIDE 100 UG/ML
INJECTION, SOLUTION INTRAVENOUS AS NEEDED
Status: DISCONTINUED | OUTPATIENT
Start: 2023-07-20 | End: 2023-07-20

## 2023-07-20 RX ORDER — FENTANYL CITRATE/PF 50 MCG/ML
25 SYRINGE (ML) INJECTION
Status: DISCONTINUED | OUTPATIENT
Start: 2023-07-20 | End: 2023-07-20 | Stop reason: HOSPADM

## 2023-07-20 RX ORDER — LIDOCAINE HYDROCHLORIDE 20 MG/ML
INJECTION, SOLUTION EPIDURAL; INFILTRATION; INTRACAUDAL; PERINEURAL AS NEEDED
Status: DISCONTINUED | OUTPATIENT
Start: 2023-07-20 | End: 2023-07-20 | Stop reason: HOSPADM

## 2023-07-20 RX ORDER — ONDANSETRON 2 MG/ML
4 INJECTION INTRAMUSCULAR; INTRAVENOUS ONCE AS NEEDED
Status: DISCONTINUED | OUTPATIENT
Start: 2023-07-20 | End: 2023-07-20 | Stop reason: HOSPADM

## 2023-07-20 RX ORDER — SODIUM CHLORIDE, SODIUM LACTATE, POTASSIUM CHLORIDE, CALCIUM CHLORIDE 600; 310; 30; 20 MG/100ML; MG/100ML; MG/100ML; MG/100ML
INJECTION, SOLUTION INTRAVENOUS CONTINUOUS PRN
Status: DISCONTINUED | OUTPATIENT
Start: 2023-07-20 | End: 2023-07-20

## 2023-07-20 RX ORDER — PROMETHAZINE HYDROCHLORIDE 25 MG/ML
12.5 INJECTION, SOLUTION INTRAMUSCULAR; INTRAVENOUS ONCE AS NEEDED
Status: DISCONTINUED | OUTPATIENT
Start: 2023-07-20 | End: 2023-07-20 | Stop reason: HOSPADM

## 2023-07-20 RX ORDER — FENTANYL CITRATE 50 UG/ML
INJECTION, SOLUTION INTRAMUSCULAR; INTRAVENOUS AS NEEDED
Status: DISCONTINUED | OUTPATIENT
Start: 2023-07-20 | End: 2023-07-20

## 2023-07-20 RX ORDER — PROPOFOL 10 MG/ML
INJECTION, EMULSION INTRAVENOUS AS NEEDED
Status: DISCONTINUED | OUTPATIENT
Start: 2023-07-20 | End: 2023-07-20

## 2023-07-20 RX ORDER — MIDAZOLAM HYDROCHLORIDE 2 MG/2ML
INJECTION, SOLUTION INTRAMUSCULAR; INTRAVENOUS AS NEEDED
Status: DISCONTINUED | OUTPATIENT
Start: 2023-07-20 | End: 2023-07-20

## 2023-07-20 RX ADMIN — PROPOFOL 100 MCG/KG/MIN: 10 INJECTION, EMULSION INTRAVENOUS at 09:38

## 2023-07-20 RX ADMIN — FENTANYL CITRATE 50 MCG: 50 INJECTION, SOLUTION INTRAMUSCULAR; INTRAVENOUS at 09:36

## 2023-07-20 RX ADMIN — CEFAZOLIN SODIUM 2000 MG: 2 SOLUTION INTRAVENOUS at 09:33

## 2023-07-20 RX ADMIN — FENTANYL CITRATE 25 MCG: 50 INJECTION, SOLUTION INTRAMUSCULAR; INTRAVENOUS at 09:44

## 2023-07-20 RX ADMIN — DEXMEDETOMIDINE HYDROCHLORIDE 4 MCG: 100 INJECTION, SOLUTION INTRAVENOUS at 09:38

## 2023-07-20 RX ADMIN — MIDAZOLAM HYDROCHLORIDE 2 MG: 1 INJECTION, SOLUTION INTRAMUSCULAR; INTRAVENOUS at 09:31

## 2023-07-20 RX ADMIN — DEXMEDETOMIDINE HYDROCHLORIDE 4 MCG: 100 INJECTION, SOLUTION INTRAVENOUS at 09:43

## 2023-07-20 RX ADMIN — SODIUM CHLORIDE, SODIUM LACTATE, POTASSIUM CHLORIDE, AND CALCIUM CHLORIDE: .6; .31; .03; .02 INJECTION, SOLUTION INTRAVENOUS at 09:23

## 2023-07-20 RX ADMIN — PROPOFOL 80 MG: 10 INJECTION, EMULSION INTRAVENOUS at 09:38

## 2023-07-20 NOTE — OP NOTE
OPERATIVE REPORT  PATIENT NAME: Davina Szymanski    :  1970  MRN: [id number]  Pt Location: [facility] OR [facility] 04    SURGERY DATE: 2023    Surgeon(s) and Role:     * Antonio Guzman MD - Primary    Preop Diagnosis:  Elevated prostate specific antigen (PSA) [R97.20]    Post-Op Diagnosis Codes:     * Elevated prostate specific antigen (PSA) [R97.20]    Procedure(s):  TRANSPERINEAL MRI FUSION  BIOPSY PROSTATE    Specimen(s):  ID Type Source Tests Collected by Time Destination   1 : RIGHT ANTERIOR MEDIAL Tissue Prostate TISSUE EXAM Antonio Guzman MD 2023 4170    2 : RIGHT ANTERIOR LATERAL Tissue Prostate TISSUE EXAM Antonio Guzman MD 2023 0080    3 : RIGHT POSTERIOR LATERAL Tissue Prostate TISSUE EXAM Antonio Guzman MD 2023 1248    4 : LEFT ANTERIOR MEDIAL Tissue Prostate TISSUE EXAM Antonio Guzman MD 2023 5960    5 : LEFT ANTERIOR LATERAL Tissue Prostate TISSUE EXAM Antonio Guzman MD 2023 2196    6 : LEFT POSTERIOR LATERAL Tissue Prostate TISSUE EXAM Antonio Guzman MD 2023 8319    7 : LEFT POSTERIOR MEDIAL Tissue Prostate TISSUE EXAM Antonio Guzman MD 2023 6731    8 : LEFT BASE Tissue Prostate TISSUE EXAM Antonio Guzman MD 2023 4454    9 : RIGHT POSTERIOR MEDIAL Tissue Prostate TISSUE EXAM Antonio Guzman MD 2023 5725    10 : RIGHT BASE Tissue Prostate TISSUE EXAM Antonio Guzman MD 2023 5851    11 : right anterior PZ apex Tissue Prostate TISSUE EXAM Antonio Guzman MD 2023 0940        Estimated Blood Loss:   Minimal    Drains:  * No LDAs found *    Anesthesia Type:   IV Sedation with Anesthesia    Operative Indications:  Patient with mildly elevated PSA and MRI showing a PI-RADS 3 lesion. Operative Findings:  Region of interest biopsied 6 times. Total of 34 biopsies taken    Complications:   None    Procedure and Technique:    Procedure was transperineal biopsy utilizing the Precision Point perineal access device    Davina Szymanski is a 50 y.o. male. Procedure was transperineal saturation prostate biopsy with MRI fusion sampling of lesion or lesions utilizing the Precision Point perineal access device utilized to map the standard geographic sites of the prostate. He was pretreated with Ancef antibiotic. He was met in the prep and hold area and the procedure along with its risks and benefits were discussed and reviewed. Patient signed an informed consent. Transferred to OR. He was placed in dorsal lithotomy with care to pad all pressure points. His perineum and genitalia were shave/prepped with a single blade razor. The scrotum was elevated a secured aware from the perineum above the rectum with sterile ioban. His perineum and genitalia were prepped with a ChloraPrep solution. With the assistance of the [address] [place] [address] technician, a survey of the prostate anatomy was performed. The technician then linked the previously obtained MRI images to the real-time ultrasound. The PIRADs lesions seen on MRI were identified on the ultrasound. Ultrasound was performed to map patient's prostate real time to [facility] with elastic deformation. In the midportion of the left and right prostate, a danya was denoted in the perineal skin. An associated skin wheal was created with 5 cc of 1% lidocaine plain in both of these mid lobe locations on either side. The PrecisionPoint device was then introduced into the left perineal subcutaneous tissue. We visualized the tip of the needle. Needle was introduced through the subcutaneous fat and into the pelvic floor muscle where a perineal pudendal block was performed with additional 5 cc of 1% lidocaine. This was repeated on the patient's right side. Utilizing the [place] [employer] access device, the perineum was access via finder needle. The Uronav machine was utilized to place the needle into the concerning area on MRI with 6 biopsies taken.   The software provided confirmation that biopsies were within the region of interest.    We then performed our standard prostate biopsy template. RIGHT posterior medial: 3 biopsies taken  RIGHT posterior lateral: 3 biopsies taken  RIGHT base: 2 biopsies taken  RIGHT anterior medial: 3 biopsies taken  RIGHT anterior lateral: 3 biopsies taken    The Precision Point access needle and stepper was re-positioned into the LEFT perineal space and ultrasound guidance was utilized to place the spring-loaded biopsy needle into the following areas    LEFT posterior medial: 3 biopsies taken  LEFT posterior lateral: 3 biopsies taken  LEFT base: 2 biopsies taken  LEFT anterior medial: 3 biopsies taken  LEFT anterior lateral: 3 biopsies taken    A total 34  biopsies were taken in total.    The transplant rectal ultrasound probe was removed. The [place] was removed from the skin. Direct pressure was held for 2 minutes for hemostasis. At the completion of the procedure, the patient was taken out of dorsal lithotomy, extubated and transferred to PACU in good condition. Overall the patient tolerated the procedure and there were no complications. Plan-the patient will return for biopsy pathology review in 2 weeks. A qualified resident physician was not available.     Patient Disposition:  PACU         SIGNATURE: Mariela Doe MD  DATE: July 20, 2023  TIME: 9:53 AM

## 2023-07-20 NOTE — DISCHARGE INSTR - AVS FIRST PAGE
Mr. Jh Burrell,      Your perineal biopsy procedure went well. We were able to obtain the samples in the areas we try to target and have sent these to Pathology for their review. The results should be back in 5-7 business days. There should be an appointment set up to discuss the results in person. Some blood in the urine is normal for approximately 1 week after surgery. It can last in the ejaculate for up to 1 month. Most patients do not need prescription medications after this procedure (including no antibiotics or narcotic pain medications). Please try taking Tylenol and Motrin over-the-counter to help with discomfort. If you are having significant pain issues please contact me. Please call us immediately if you are having fevers or chills or feel like you have a infection. Some patients can have difficulty with voiding after a biopsy because of swelling of the prostate which can block the urethra. This usually improves with time but if you are having difficulty voiding please let us now as we may need to temporarily insert a catheter. You have any questions or concerns please do not hesitate to call us, [phone]. Keith Garcia MD      Prostate Biopsy   WHAT YOU NEED TO KNOW:   A prostate biopsy is a procedure to remove samples of tissue from your prostate gland. The prostate is a male sex gland that makes fluid found in semen. It is located just below the bladder. After the samples are removed, they are sent to a lab and tested for cancer. DISCHARGE INSTRUCTIONS:   Seek care immediately if:   You have heavy bleeding from your urethra. You urinate very little or not at all. You have pain from your procedure that gets worse, even after you take pain medicine. Call your urologist or doctor if:   You have a fever or chills. You feel pain or burning when you urinate. Your urine is cloudy or smells bad.     You have questions or concerns about your condition or care. Medicines: You may need any of the following:  Antibiotics  help prevent or treat a bacterial infection. Acetaminophen  decreases pain and fever. It is available without a doctor's order. Ask how much to take and how often to take it. Follow directions. Read the labels of all other medicines you are using to see if they also contain acetaminophen, or ask your doctor or pharmacist. Acetaminophen can cause liver damage if not taken correctly. Do not use more than 4 grams (4,000 milligrams) total of acetaminophen in one day. Prescription pain medicine  may be given. Ask your healthcare provider how to take this medicine safely. Some prescription pain medicines contain acetaminophen. Do not take other medicines that contain acetaminophen without talking to your healthcare provider. Too much acetaminophen may cause liver damage. Prescription pain medicine may cause constipation. Ask your healthcare provider how to prevent or treat constipation. Take your medicine as directed. Contact your healthcare provider if you think your medicine is not helping or if you have side effects. Tell him or her if you are allergic to any medicine. Keep a list of the medicines, vitamins, and herbs you take. Include the amounts, and when and why you take them. Bring the list or the pill bottles to follow-up visits. Carry your medicine list with you in case of an emergency. Follow up with your urologist or doctor as directed: You may need to return for more tests or procedures. Write down your questions so you remember to ask them during your visits. © Copyright [employer] 2021 Information is for End User's use only and may not be sold, redistributed or otherwise used for commercial purposes. All illustrations and images included in CareNotes® are the copyrighted property of [employer].D.A[employer] Inc. or [address] [place] [address]  The above information is an  only.  It is not intended as medical advice for individual conditions or treatments. Talk to your doctor, nurse or pharmacist before following any medical regimen to see if it is safe and effective for you.

## 2023-07-20 NOTE — ANESTHESIA POSTPROCEDURE EVALUATION
Post-Op Assessment Note    CV Status:  Stable  Pain Score: 0    Pain management: adequate     Mental Status:  Alert and awake   Hydration Status:  Euvolemic   PONV Controlled:  Controlled   Airway Patency:  Patent      Post Op Vitals Reviewed: Yes      Staff: CRNA         No notable events documented.     BP   114/71   Temp 97.1   Pulse 73   Resp 20   SpO2 94% 4 L mask

## 2023-07-20 NOTE — ANESTHESIA PREPROCEDURE EVALUATION
Procedure:  TRANSPERINEAL MRI FUSION  BIOPSY PROSTATE (Perineum)    Relevant Problems   No relevant active problems        Physical Exam    Airway    Mallampati score: I  TM Distance: >3 FB  Neck ROM: full     Dental       Cardiovascular  Cardiovascular exam normal    Pulmonary  Pulmonary exam normal     Other Findings        Anesthesia Plan  ASA Score- 1     Anesthesia Type- IV sedation with anesthesia with ASA Monitors. Additional Monitors:   Airway Plan:           Plan Factors-Exercise tolerance (METS): >4 METS. Chart reviewed. EKG reviewed. Imaging results reviewed. Existing labs reviewed. Patient summary reviewed. Induction- intravenous. Postoperative Plan- Plan for postoperative opioid use. Planned trial extubation    Informed Consent- Anesthetic plan and risks discussed with patient. I personally reviewed this patient with the CRNA. Discussed and agreed on the Anesthesia Plan with the CRNA. Jairo Mccarthy

## 2023-07-20 NOTE — H&P
UROLOGY HISTORY AND PHYSICAL     Patient Identifiers: Ricky Howard (MRN [id number])      Date of Service: 7/20/2023        ASSESSMENT:     50 y.o. old male with  elevated PSA and MRI with Pirads 3 lesion on the right and abnormal JANE on the right. PLAN:     MRI guided fusion biopsy of prostate      History of Present Illness:     Ricky Howard is a 50 y.o. old with a history of elevated PSA.       The patient's PSA was 4.7 in April 2023. He apparently had an outside transrectal prostate ultrasound showing a 36 cc prostate with no suspicious masses.    He elected for an MRI of the prostate which showed a PI-RADS 3 lesion at the right peripheral zone near the apex. JANE at prior visit showed asymmetry in the right side.     He complains of urinary frequency every hour to hour and a half during the day and up to 4-5 times per night.  He was put on Levaquin and tamsulosin by his primary care provider. Pardeep Hope reports no difference in his symptoms with the Flomax.    He continues to report issues primarily with nocturia.     He denies any family history of prostate bladder or kidney diseases.      Past Medical, Past Surgical History:     Past Medical History:   Diagnosis Date   • Allergic rhinitis    • Colon polyps    • Diarrhea     along with difficulty evacuating stool at times. pt states it is like something clamps down during evacuation and cant release   :    Past Surgical History:   Procedure Laterality Date   • COLONOSCOPY N/A 2/25/2016    Procedure: COLONOSCOPY;  Surgeon: Rey Lucas MD;  Location: [facility]; Service:    • COLONOSCOPY N/A 5/7/2018    Procedure: COLONOSCOPY;  Surgeon: Rey Lucas MD;  Location: [facility] GI LAB;   Service: Gastroenterology   • HERNIA REPAIR      infant   :    Medications, Allergies:     Current Facility-Administered Medications:   •  albuterol inhalation solution 2.5 mg, 2.5 mg, Nebulization, Once PRN, Dee Augustin MD  •  fentaNYL (SUBLIMAZE) injection 25 mcg, 25 mcg, Intravenous, Q5 Min PRN, Radha Singh MD  •  HYDROmorphone (DILAUDID) injection 0.5 mg, 0.5 mg, Intravenous, Q10 Min PRN, Radha Singh MD  •  meperidine (DEMEROL) injection 12.5 mg, 12.5 mg, Intravenous, Q10 Min PRN, Radha Singh MD  •  ondansetron [facility]) injection 4 mg, 4 mg, Intravenous, Once PRN, Radha Singh MD  •  promethazine (PHENERGAN) injection 12.5 mg, 12.5 mg, Intravenous, Once PRN, Radha Singh MD    Allergies:  No Known Allergies:    Social and Family History:   Social History:   Social History     Tobacco Use   • Smoking status: Former     Packs/day: 1.00     Years: 10.00     Total pack years: 10.00     Types: Cigarettes     Quit date:      Years since quittin.5   • Smokeless tobacco: Never   Vaping Use   • Vaping Use: Never used   Substance Use Topics   • Alcohol use: Not Currently   • Drug use: Never   . Social History     Tobacco Use   Smoking Status Former   • Packs/day: 1.00   • Years: 10.00   • Total pack years: 10.   • Types: Cigarettes   • Quit date:    • Years since quittin.5   Smokeless Tobacco Never       Family History:  Family History   Problem Relation Age of Onset   • No Known Problems Father    • Heart disease Mother    • Hypertension Mother    :     Review of Systems:     General: Fever, chills, or night sweats: negative  Cardiac: Negative for chest pain. Pulmonary: Negative for shortness of breath. Gastrointestinal: Abdominal pain negative  Nausea, vomiting, or diarrhea negative  Genitourinary: See HPI above. Patient does nothave hematuria. All other systems queried were negative. Physical Exam:   General: Patient is pleasant and in NAD. Awake and alert  /94   Pulse 65   Temp (!) 97.2 °F (36.2 °C) (Temporal)   Resp 18   SpO2 95%   HEENT:  Normocephalic atraumatic  Cardiac:  Regular rate and rhythm, Peripheral edema: negative  Pulmonary: Non-labored breathing, CTAB  Abdomen: Soft, non-tender, non-distended. No surgical scars. No masses, tenderness, hernias noted. Genitourinary: negative CVA tenderness, neg suprapubic tenderness. Extremities: normal movement in all 4       Labs:     Lab Results   Component Value Date    HGB 16.4 07/08/2023    HCT 49.5 (H) 07/08/2023    WBC 5.10 07/08/2023     07/08/2023   ]    Lab Results   Component Value Date     10/03/2015    K 4.2 07/08/2023     07/08/2023    CO2 26 07/08/2023    BUN 22 07/08/2023    CREATININE 0.96 07/08/2023    CALCIUM 9.5 07/08/2023    GLUCOSE 97 10/03/2015   ]    Imaging:   I personally reviewed the images and report of the following studies, and reviewed them with the patient:    MRI showing a PI-RADS 3 lesion located at the right anterior peripheral zone apex    Thank you for allowing me to participate in this patients’ care. Please do not hesitate to call with any additional questions.   Malu Lipscomb MD

## 2023-07-24 PROCEDURE — G0416 PROSTATE BIOPSY, ANY MTHD: HCPCS | Performed by: PATHOLOGY

## 2023-07-24 PROCEDURE — 88344 IMHCHEM/IMCYTCHM EA MLT ANTB: CPT | Performed by: PATHOLOGY

## 2023-07-25 ENCOUNTER — TELEPHONE (OUTPATIENT)
Dept: UROLOGY | Facility: AMBULATORY SURGERY CENTER | Age: 53
End: 2023-07-25

## 2023-07-25 NOTE — TELEPHONE ENCOUNTER
Pt under care of: Chuy Marianor     Last Seen: 7/20/23 Surgical procedure     TRANSPERINEAL MRI FUSION  BIOPSY PROSTATE (Perineum)    Pt calling due to:    Patient calling in stating he is still having blood in his urine. He states he was not told any restrictions after this procedure. Patient states any time he exerts himself the bleeding gets worse. Last night he passed about 10-15 clots. Patient is also concerned about results and questioning if he can receive a call back about post op concerns and results of biopsy.      Pt can be reached at: [phone]

## 2023-08-01 ENCOUNTER — OFFICE VISIT (OUTPATIENT)
Dept: UROLOGY | Facility: CLINIC | Age: 53
End: 2023-08-01
Payer: COMMERCIAL

## 2023-08-01 VITALS
SYSTOLIC BLOOD PRESSURE: 120 MMHG | HEART RATE: 90 BPM | BODY MASS INDEX: 28.76 KG/M2 | HEIGHT: 73 IN | WEIGHT: 217 LBS | DIASTOLIC BLOOD PRESSURE: 80 MMHG | OXYGEN SATURATION: 95 %

## 2023-08-01 DIAGNOSIS — R97.20 ELEVATED PSA: Primary | ICD-10-CM

## 2023-08-01 DIAGNOSIS — R35.1 NOCTURIA: ICD-10-CM

## 2023-08-01 PROCEDURE — 99214 OFFICE O/P EST MOD 30 MIN: CPT | Performed by: UROLOGY

## 2023-08-01 NOTE — ASSESSMENT & PLAN NOTE
The patient has nocturia issues but have improved by cutting down on drinking before bed. He is overall quite satisfied his voiding at this time and nocturia x 1-2 is normal.  Therefore we will hold off on considering medications.

## 2023-08-01 NOTE — ASSESSMENT & PLAN NOTE
MRI fusion biopsy negative. Did show 1 core of ASAP suspicious for low risk but this is of low clinical significance. I do think it warrants us to check a PSA in 6 months and follow it overall more closely than we otherwise would in men. We will therefore see him back in 4 months with a PSA which is 6 months from last check.

## 2023-08-01 NOTE — PROGRESS NOTES
Assessment/Plan:    Elevated PSA  MRI fusion biopsy negative. Did show 1 core of ASAP suspicious for low risk but this is of low clinical significance. I do think it warrants us to check a PSA in 6 months and follow it overall more closely than we otherwise would in men. We will therefore see him back in 4 months with a PSA which is 6 months from last check. Nocturia  The patient has nocturia issues but have improved by cutting down on drinking before bed. He is overall quite satisfied his voiding at this time and nocturia x 1-2 is normal.  Therefore we will hold off on considering medications. Subjective:      Patient ID: Nahomy Galvan is a 50 y.o. male. KATIE Davalos is a 50 y. o. male with elevated PSA and nocturia.       The patient's PSA was 4.7 in April 2023. He apparently had an outside transrectal prostate ultrasound showing a 36 cc prostate with no suspicious masses. Adina Hodges elected for an MRI of the prostate which showed a PI-RADS 3 lesion at the right peripheral zone near the apex. JANE at prior visit showed asymmetry in the right side. This prompted an MRI guided fusion biopsy July 2023 but returned benign (1 core on the right side did show ASAP suspicious for possible low-grade cancer).      He had a complaint of urinary frequency every hour to hour and a half during the day and up to 4-5 times per night.  He was put on Levaquin and tamsulosin by his primary care provider. Adina Hodges reports no difference in his symptoms with the Flomax.      His symptoms have overall improved and he only has nocturia x1-2 and is happier with day time voiding as well. He denies any family history of prostate bladder or kidney diseases.      Past Surgical History:   Procedure Laterality Date   • COLONOSCOPY N/A 2/25/2016    Procedure: COLONOSCOPY;  Surgeon: Lowell Starkey MD;  Location: [facility] GI LAB;   Service:    • COLONOSCOPY N/A 5/7/2018    Procedure: COLONOSCOPY;  Surgeon: Lowell Starkey MD;  Location: [facility]; Service: Gastroenterology   • HERNIA REPAIR      infant   • W[place] BX PROSTATE STRTCTC SATURATION SAMPLING IMG GID N/A 7/20/2023    Procedure: TRANSPERINEAL MRI FUSION  BIOPSY PROSTATE;  Surgeon: Mushtaq Mendez MD;  Location: [facility];  Service: Urology        Past Medical History:   Diagnosis Date   • Allergic rhinitis    • Colon polyps    • Diarrhea     along with difficulty evacuating stool at times. pt states it is like something clamps down during evacuation and cant release        AUA SYMPTOM SCORE    Flowsheet Row Most Recent Value   AUA SYMPTOM SCORE    How often have you had a sensation of not emptying your bladder completely after you finished urinating? 3 (P)     How often have you had to urinate again less than two hours after you finished urinating? 5 (P)     How often have you found you stopped and started again several times when you urinate? 2 (P)     How often have you found it difficult to postpone urination? 0 (P)     How often have you had a weak urinary stream? 1 (P)     How often have you had to push or strain to begin urination? 0 (P)     How many times did you most typically get up to urinate from the time you went to bed at night until the time you got up in the morning? 5 (P)     Quality of Life: If you were to spend the rest of your life with your urinary condition just the way it is now, how would you feel about that? 6 (P)     AUA SYMPTOM SCORE 16 (P)            Review of Systems   Constitutional: Negative for chills and fever. HENT: Negative for ear pain and sore throat. Eyes: Negative for pain and visual disturbance. Respiratory: Negative for cough and shortness of breath. Cardiovascular: Negative for chest pain and palpitations. Gastrointestinal: Negative for abdominal pain and vomiting. Genitourinary: Negative for dysuria and hematuria. Musculoskeletal: Negative for arthralgias and back pain. Skin: Negative for color change and rash.    Neurological: Negative for seizures and syncope. All other systems reviewed and are negative. Objective:      /80 (BP Location: Left arm, Patient Position: Sitting, Cuff Size: Standard)   Pulse 90   Ht 6' 1" (1.854 m)   Wt 98.4 kg (217 lb)   SpO2 95%   BMI 28.63 kg/m²     Lab Results   Component Value Date    PSA 4.77 (H) 04/29/2023          Physical Exam  Vitals reviewed. Constitutional:       Appearance: Normal appearance. He is normal weight. HENT:      Head: Normocephalic and atraumatic. Eyes:      Pupils: Pupils are equal, round, and reactive to light. Abdominal:      General: Abdomen is flat. Neurological:      General: No focal deficit present. Mental Status: He is alert and oriented to person, place, and time. Psychiatric:         Mood and Affect: Mood normal.         Thought Content: Thought content normal.           Final Diagnosis   A. Prostate, RIGHT ANTERIOR MEDIAL:  - Atypical small acinar proliferation (ASAP), suspicious for low-grade prostatic adenocarcinoma. See comment.     B. Prostate, RIGHT ANTERIOR LATERAL:  - Benign prostate tissue.      C. Prostate, RIGHT POSTERIOR LATERAL:  - Benign prostate tissue.      D. Prostate, LEFT ANTERIOR MEDIAL:  - Benign prostate tissue.      E. Prostate, LEFT ANTERIOR LATERAL:  - Benign prostate tissue.      F. Prostate, LEFT POSTERIOR LATERAL:  - Benign prostate tissue.      G. Prostate, LEFT POSTERIOR MEDIAL:  - Benign prostate tissue.      H. Prostate, LEFT BASE:  - Benign prostate tissue.      I. Prostate, RIGHT POSTERIOR MEDIAL:  - Benign prostate tissue.      J. Prostate, RIGHT BASE:  - Benign prostate tissue.      K. Prostate, right anterior PZ apex:  - Benign prostate tissue. See comment.     Comment: Immunohistochemistry for a prostate multiplex stain (p63, K903, and P504S) on blocks A2 and K5 is consistent with the diagnosis.   A2 demonstrates positive P504S and absent basal markers in a small focus <5% of the involved core Orders  Orders Placed This Encounter   Procedures   • PSA Total, Diagnostic     Standing Status:   Future     Standing Expiration Date:   8/1/2024

## 2023-10-21 ENCOUNTER — APPOINTMENT (OUTPATIENT)
Dept: LAB | Facility: CLINIC | Age: 53
End: 2023-10-21
Payer: COMMERCIAL

## 2023-10-21 DIAGNOSIS — R97.20 ELEVATED PSA: ICD-10-CM

## 2023-10-21 LAB
ANION GAP SERPL CALCULATED.3IONS-SCNC: 5 MMOL/L
BUN SERPL-MCNC: 17 MG/DL (ref 5–25)
CALCIUM SERPL-MCNC: 9.4 MG/DL (ref 8.4–10.2)
CHLORIDE SERPL-SCNC: 105 MMOL/L (ref 96–108)
CO2 SERPL-SCNC: 28 MMOL/L (ref 21–32)
CREAT SERPL-MCNC: 0.88 MG/DL (ref 0.6–1.3)
GFR SERPL CREATININE-BSD FRML MDRD: 100 ML/MIN/1.73SQ M
GLUCOSE P FAST SERPL-MCNC: 107 MG/DL (ref 65–99)
POTASSIUM SERPL-SCNC: 4 MMOL/L (ref 3.5–5.3)
PSA SERPL-MCNC: 10.2 NG/ML (ref 0–4)
SODIUM SERPL-SCNC: 138 MMOL/L (ref 135–147)

## 2023-10-21 PROCEDURE — 80048 BASIC METABOLIC PNL TOTAL CA: CPT

## 2023-10-21 PROCEDURE — 36415 COLL VENOUS BLD VENIPUNCTURE: CPT

## 2023-10-21 PROCEDURE — 84153 ASSAY OF PSA TOTAL: CPT

## 2023-11-02 ENCOUNTER — TELEPHONE (OUTPATIENT)
Dept: UROLOGY | Facility: AMBULATORY SURGERY CENTER | Age: 53
End: 2023-11-02

## 2023-11-02 NOTE — TELEPHONE ENCOUNTER
I called the patient discussed his PSA which has doubled since last being checked. In the interval he did have a MRI which showed a very small right anterior peripheral zone lesion at the apex resulting in fusion biopsy which did not show prostate cancer (did show 1 core of ASAP on the right side that was suspicious for potential low-grade prostate cancer). I got the patient's voicemail. He is seeing us in clinic in a few days to discuss in person.

## 2023-11-06 ENCOUNTER — OFFICE VISIT (OUTPATIENT)
Dept: UROLOGY | Facility: CLINIC | Age: 53
End: 2023-11-06
Payer: COMMERCIAL

## 2023-11-06 VITALS
SYSTOLIC BLOOD PRESSURE: 126 MMHG | HEART RATE: 93 BPM | RESPIRATION RATE: 18 BRPM | OXYGEN SATURATION: 96 % | HEIGHT: 73 IN | DIASTOLIC BLOOD PRESSURE: 88 MMHG | BODY MASS INDEX: 29.16 KG/M2 | WEIGHT: 220 LBS

## 2023-11-06 DIAGNOSIS — R97.20 ELEVATED PSA: Primary | ICD-10-CM

## 2023-11-06 PROCEDURE — 99213 OFFICE O/P EST LOW 20 MIN: CPT | Performed by: PHYSICIAN ASSISTANT

## 2023-11-06 NOTE — PROGRESS NOTES
UROLOGY PROGRESS NOTE   Patient Identifiers: Dallin Knott (MRN [id number])  Date of Service: 11/6/2023    Subjective:   45-year-old man with history of elevated PSA. He had a prostate biopsy in July showing 1 core of ASAP and all the rest benign. Prebiopsy PSA was 4.77. Postbiopsy PSA 10.2. He has some urinary frequency and is not on any medications.     Reason for visit: Elevated PSA follow-up    Objective:     VITALS:    Vitals:    11/06/23 1133   BP: 126/88   Pulse: 93   Resp: 18   SpO2: 96%     AUA SYMPTOM SCORE      Flowsheet Row Most Recent Value   AUA SYMPTOM SCORE    How often have you had a sensation of not emptying your bladder completely after you finished urinating? 5 (P)     How often have you had to urinate again less than two hours after you finished urinating? 5 (P)     How often have you found you stopped and started again several times when you urinate? 2 (P)     How often have you found it difficult to postpone urination? 2 (P)     How often have you had a weak urinary stream? 3 (P)     How often have you had to push or strain to begin urination? 2 (P)     How many times did you most typically get up to urinate from the time you went to bed at night until the time you got up in the morning? 2 (P)     Quality of Life: If you were to spend the rest of your life with your urinary condition just the way it is now, how would you feel about that? 5 (P)     AUA SYMPTOM SCORE 21 (P)               LABS:  Lab Results   Component Value Date    HGB 16.4 07/08/2023    HCT 49.5 (H) 07/08/2023    WBC 5.10 07/08/2023     07/08/2023   ]    Lab Results   Component Value Date     10/03/2015    K 4.0 10/21/2023     10/21/2023    CO2 28 10/21/2023    BUN 17 10/21/2023    CREATININE 0.88 10/21/2023    CALCIUM 9.4 10/21/2023    GLUCOSE 97 10/03/2015   ]        INPATIENT MEDS:    Current Outpatient Medications:     acetaminophen (TYLENOL) 325 mg tablet, Take 650 mg by mouth 2 (two) times a day, Disp: , Rfl:     aspirin-acetaminophen-caffeine (EXCEDRIN MIGRAINE) 250-250-65 MG per tablet, Take 2 tablets by mouth every 6 (six) hours as needed for headaches., Disp: , Rfl:     cetirizine (ZyrTEC) 10 mg tablet, Take 10 mg by mouth daily, Disp: , Rfl:       Physical Exam:   /88 (BP Location: Left arm, Patient Position: Sitting, Cuff Size: Adult)   Pulse 93   Resp 18   Ht 6' 1" (1.854 m)   Wt 99.8 kg (220 lb)   SpO2 96%   BMI 29.03 kg/m²   GEN: no acute distress    RESP: breathing comfortably with no accessory muscle use    ABD: soft, non-tender, non-distended   INCISION:    EXT: no significant peripheral edema       RADIOLOGY:   none     Assessment:   1.   Elevated PSA    Plan:   -PSA in 3 months and follow-up in 6 months with PSA prior to visit  -Repeat prostate exam and MRI if his PSA continues to rise  -  -

## 2023-12-26 NOTE — MISCELLANEOUS
Message  Return to work or school:   Geraldine Davila is under my professional care   He was seen in my office on 2/25/2016   He is able to return to work on  2/26/2016            Signatures   Electronically signed by : Mohit Camargo, ; Feb 25 2016  2:43PM EST                       (Author) No

## 2024-02-10 ENCOUNTER — APPOINTMENT (OUTPATIENT)
Dept: LAB | Facility: CLINIC | Age: 54
End: 2024-02-10
Payer: COMMERCIAL

## 2024-02-10 DIAGNOSIS — R97.20 ELEVATED PSA: ICD-10-CM

## 2024-02-10 LAB — PSA SERPL-MCNC: 7.54 NG/ML (ref 0–4)

## 2024-02-10 PROCEDURE — 84153 ASSAY OF PSA TOTAL: CPT

## 2024-02-10 PROCEDURE — 36415 COLL VENOUS BLD VENIPUNCTURE: CPT

## 2024-02-20 ENCOUNTER — TELEPHONE (OUTPATIENT)
Age: 54
End: 2024-02-20

## 2024-02-20 DIAGNOSIS — R97.20 ELEVATED PSA: Primary | ICD-10-CM

## 2024-02-20 NOTE — TELEPHONE ENCOUNTER
Patient called requesting a call back from Mukesh to discuss his elevated PSA. Last one  on 2/10 was 7.54 from 10.20, but he's still concerned    CB: [phone]

## 2024-02-27 NOTE — TELEPHONE ENCOUNTER
psa continues to come down after negative biopsy last year. repeat psa once more before may appt    Lab Results   Component Value Date    PSA 7.54 (H) 02/10/2024    PSA 10.20 (H) 10/21/2023    PSA 4.77 (H) 04/29/2023

## 2024-05-04 ENCOUNTER — APPOINTMENT (OUTPATIENT)
Dept: LAB | Facility: CLINIC | Age: 54
End: 2024-05-04
Payer: COMMERCIAL

## 2024-05-04 DIAGNOSIS — R97.20 ELEVATED PSA: ICD-10-CM

## 2024-05-04 PROCEDURE — 36415 COLL VENOUS BLD VENIPUNCTURE: CPT

## 2024-05-04 PROCEDURE — 84153 ASSAY OF PSA TOTAL: CPT

## 2024-05-04 PROCEDURE — 84154 ASSAY OF PSA FREE: CPT

## 2024-05-05 LAB
PSA FREE MFR SERPL: 8.4 %
PSA FREE SERPL-MCNC: 0.56 NG/ML
PSA SERPL-MCNC: 6.7 NG/ML (ref 0–4)

## 2024-05-13 ENCOUNTER — OFFICE VISIT (OUTPATIENT)
Dept: UROLOGY | Facility: CLINIC | Age: 54
End: 2024-05-13
Payer: COMMERCIAL

## 2024-05-13 VITALS
HEART RATE: 94 BPM | SYSTOLIC BLOOD PRESSURE: 140 MMHG | OXYGEN SATURATION: 96 % | BODY MASS INDEX: 29.29 KG/M2 | DIASTOLIC BLOOD PRESSURE: 82 MMHG | HEIGHT: 73 IN | WEIGHT: 221 LBS | RESPIRATION RATE: 16 BRPM

## 2024-05-13 DIAGNOSIS — R68.82 LOW LIBIDO: ICD-10-CM

## 2024-05-13 DIAGNOSIS — N13.8 BPH WITH OBSTRUCTION/LOWER URINARY TRACT SYMPTOMS: Primary | ICD-10-CM

## 2024-05-13 DIAGNOSIS — N40.1 BPH WITH OBSTRUCTION/LOWER URINARY TRACT SYMPTOMS: Primary | ICD-10-CM

## 2024-05-13 PROCEDURE — 99213 OFFICE O/P EST LOW 20 MIN: CPT | Performed by: PHYSICIAN ASSISTANT

## 2024-05-13 RX ORDER — ALFUZOSIN HYDROCHLORIDE 10 MG/1
10 TABLET, EXTENDED RELEASE ORAL DAILY
Qty: 90 TABLET | Refills: 3 | Status: SHIPPED | OUTPATIENT
Start: 2024-05-13

## 2024-05-13 RX ORDER — DEXTROMETHORPHAN HYDROBROMIDE AND PROMETHAZINE HYDROCHLORIDE 15; 6.25 MG/5ML; MG/5ML
5 SYRUP ORAL 4 TIMES DAILY PRN
COMMUNITY
Start: 2024-02-02

## 2024-05-13 RX ORDER — ZOLPIDEM TARTRATE 5 MG/1
1 TABLET ORAL
COMMUNITY

## 2024-05-13 RX ORDER — TAMSULOSIN HYDROCHLORIDE 0.4 MG/1
CAPSULE ORAL
COMMUNITY

## 2024-05-13 NOTE — PROGRESS NOTES
UROLOGY PROGRESS NOTE   Patient Identifiers: Jay Davalos (MRN [id number])  Date of Service: 5/13/2024    Subjective:   53-year-old man history of elevated PSA.  He had a prostate biopsy in July showing 1 core of ASAP and the rest was benign.  Prebiopsy PSA was 4.77.  Postbiopsy PSA was 10.2.  PSA is now down to 6.7.  No significant outlet complaints.    Reason for visit: Elevated PSA follow-up    Objective:     VITALS:    Vitals:    05/13/24 1531   BP: 140/82   Pulse: 94   Resp: 16   SpO2: 96%     AUA SYMPTOM SCORE      Flowsheet Row Most Recent Value   AUA SYMPTOM SCORE    How often have you had a sensation of not emptying your bladder completely after you finished urinating? 5 (P)    How often have you had to urinate again less than two hours after you finished urinating? 5 (P)    How often have you found you stopped and started again several times when you urinate? 3 (P)    How often have you found it difficult to postpone urination? 0 (P)    How often have you had a weak urinary stream? 0 (P)    How often have you had to push or strain to begin urination? 0 (P)    How many times did you most typically get up to urinate from the time you went to bed at night until the time you got up in the morning? 4 (P)    Quality of Life: If you were to spend the rest of your life with your urinary condition just the way it is now, how would you feel about that? 5 (P)    AUA SYMPTOM SCORE 17 (P)               LABS:  Lab Results   Component Value Date    HGB 16.4 07/08/2023    HCT 49.5 (H) 07/08/2023    WBC 5.10 07/08/2023     07/08/2023   ]    Lab Results   Component Value Date     10/03/2015    K 4.0 10/21/2023     10/21/2023    CO2 28 10/21/2023    BUN 17 10/21/2023    CREATININE 0.88 10/21/2023    CALCIUM 9.4 10/21/2023    GLUCOSE 97 10/03/2015   ]        INPATIENT MEDS:    Current Outpatient Medications:     acetaminophen (TYLENOL) 325 mg tablet, Take 650 mg by mouth 2 (two) times a day, Disp: , Rfl:     " alfuzosin (UROXATRAL) 10 mg 24 hr tablet, Take 1 tablet (10 mg total) by mouth daily, Disp: 90 tablet, Rfl: 3    aspirin-acetaminophen-caffeine (EXCEDRIN MIGRAINE) 250-250-65 MG per tablet, Take 2 tablets by mouth every 6 (six) hours as needed for headaches., Disp: , Rfl:     cetirizine (ZyrTEC) 10 mg tablet, Take 10 mg by mouth daily, Disp: , Rfl:     promethazine-dextromethorphan (PHENERGAN-DM) 6.25-15 mg/5 mL oral syrup, Take 5 mL by mouth 4 (four) times a day as needed, Disp: , Rfl:     tamsulosin (FLOMAX) 0.4 mg, 1 CAPSULE ORALLY ONCE A DAY 45 DAY(S), Disp: , Rfl:     zolpidem (AMBIEN) 5 mg tablet, Take 1 tablet by mouth daily at bedtime as needed, Disp: , Rfl:       Physical Exam:   /82 (BP Location: Left arm, Patient Position: Sitting, Cuff Size: Adult)   Pulse 94   Resp 16   Ht 6' 1\" (1.854 m)   Wt 100 kg (221 lb)   SpO2 96%   BMI 29.16 kg/m²   GEN: no acute distress    RESP: breathing comfortably with no accessory muscle use    ABD: soft, non-tender, non-distended   INCISION:    EXT: no significant peripheral edema       RADIOLOGY:   none     Assessment:   #1.  Elevated PSA    Plan:   -Follow-up in 6 months with PSA prior to visit  -Repeat prostate exam at that time  -  -          "

## 2024-11-23 ENCOUNTER — APPOINTMENT (OUTPATIENT)
Dept: LAB | Facility: CLINIC | Age: 54
End: 2024-11-23
Payer: COMMERCIAL

## 2024-11-23 DIAGNOSIS — R68.82 LOW LIBIDO: ICD-10-CM

## 2024-11-23 DIAGNOSIS — N40.1 BPH WITH OBSTRUCTION/LOWER URINARY TRACT SYMPTOMS: ICD-10-CM

## 2024-11-23 DIAGNOSIS — N13.8 BPH WITH OBSTRUCTION/LOWER URINARY TRACT SYMPTOMS: ICD-10-CM

## 2024-11-23 LAB — PSA SERPL-MCNC: 8.52 NG/ML (ref 0–4)

## 2024-11-23 PROCEDURE — 84403 ASSAY OF TOTAL TESTOSTERONE: CPT

## 2024-11-23 PROCEDURE — 36415 COLL VENOUS BLD VENIPUNCTURE: CPT

## 2024-11-23 PROCEDURE — 84402 ASSAY OF FREE TESTOSTERONE: CPT

## 2024-11-23 PROCEDURE — 84153 ASSAY OF PSA TOTAL: CPT

## 2024-11-25 LAB
TESTOST FREE SERPL-MCNC: 9.4 PG/ML (ref 7.2–24)
TESTOST SERPL-MCNC: 400 NG/DL (ref 264–916)

## 2024-11-26 ENCOUNTER — OFFICE VISIT (OUTPATIENT)
Dept: UROLOGY | Facility: CLINIC | Age: 54
End: 2024-11-26
Payer: COMMERCIAL

## 2024-11-26 VITALS
SYSTOLIC BLOOD PRESSURE: 120 MMHG | HEIGHT: 73 IN | OXYGEN SATURATION: 95 % | HEART RATE: 96 BPM | BODY MASS INDEX: 29.42 KG/M2 | DIASTOLIC BLOOD PRESSURE: 80 MMHG | WEIGHT: 222 LBS

## 2024-11-26 DIAGNOSIS — R97.20 ELEVATED PSA: Primary | ICD-10-CM

## 2024-11-26 PROCEDURE — 99213 OFFICE O/P EST LOW 20 MIN: CPT | Performed by: PHYSICIAN ASSISTANT

## 2024-11-26 NOTE — PROGRESS NOTES
UROLOGY PROGRESS NOTE   Patient Identifiers: Jay Davalos (MRN [id number])  Date of Service: 11/26/2024    Subjective:   53-year-old man history of elevated PSA.  He had a prostate biopsy in July 2023 showing 1 core of ASAP and the rest benign.  High PSA was 10.2 postbiopsy.  Current PSA 8.51.  He is on alfuzosin.  Voiding pattern is stable.  Prostate size about 48 g.    Reason for visit: Elevated PSA follow-up    Objective:     VITALS:    Vitals:    11/26/24 1433   BP: 120/80   Pulse: 96   SpO2: 95%     AUA SYMPTOM SCORE      Flowsheet Row Most Recent Value   AUA SYMPTOM SCORE    How often have you had a sensation of not emptying your bladder completely after you finished urinating? 3 (P)     How often have you had to urinate again less than two hours after you finished urinating? 5 (P)     How often have you found you stopped and started again several times when you urinate? 2 (P)     How often have you found it difficult to postpone urination? 0 (P)     How often have you had a weak urinary stream? 1 (P)     How often have you had to push or strain to begin urination? 0 (P)     How many times did you most typically get up to urinate from the time you went to bed at night until the time you got up in the morning? 3 (P)     Quality of Life: If you were to spend the rest of your life with your urinary condition just the way it is now, how would you feel about that? 5 (P)     AUA SYMPTOM SCORE 14 (P)                LABS:  Lab Results   Component Value Date    HGB 16.4 07/08/2023    HCT 49.5 (H) 07/08/2023    WBC 5.10 07/08/2023     07/08/2023   ]    Lab Results   Component Value Date     10/03/2015    K 4.0 10/21/2023     10/21/2023    CO2 28 10/21/2023    BUN 17 10/21/2023    CREATININE 0.88 10/21/2023    CALCIUM 9.4 10/21/2023    GLUCOSE 97 10/03/2015   ]        INPATIENT MEDS:    Current Outpatient Medications:     acetaminophen (TYLENOL) 325 mg tablet, Take 650 mg by mouth 2 (two) times a day,  "Disp: , Rfl:     alfuzosin (UROXATRAL) 10 mg 24 hr tablet, Take 1 tablet (10 mg total) by mouth daily, Disp: 90 tablet, Rfl: 3    aspirin-acetaminophen-caffeine (EXCEDRIN MIGRAINE) 250-250-65 MG per tablet, Take 2 tablets by mouth every 6 (six) hours as needed for headaches., Disp: , Rfl:     cetirizine (ZyrTEC) 10 mg tablet, Take 10 mg by mouth daily, Disp: , Rfl:     promethazine-dextromethorphan (PHENERGAN-DM) 6.25-15 mg/5 mL oral syrup, Take 5 mL by mouth 4 (four) times a day as needed, Disp: , Rfl:     tamsulosin (FLOMAX) 0.4 mg, 1 CAPSULE ORALLY ONCE A DAY 45 DAY(S), Disp: , Rfl:     zolpidem (AMBIEN) 5 mg tablet, Take 1 tablet by mouth daily at bedtime as needed, Disp: , Rfl:       Physical Exam:   /80 (BP Location: Left arm, Patient Position: Sitting, Cuff Size: Standard)   Pulse 96   Ht 6' 1\" (1.854 m)   Wt 101 kg (222 lb)   SpO2 95%   BMI 29.29 kg/m²   GEN: no acute distress    RESP: breathing comfortably with no accessory muscle use    ABD: soft, non-tender, non-distended   INCISION:    EXT: no significant peripheral edema     RADIOLOGY:   none     Assessment:   #1.  Elevated PSA    Plan:   -Follow-up in 6 months with PSA prior to visit  -If continues to be persistently elevated would repeat MRI and consider follow-up biopsy  -  -          "

## 2025-05-19 ENCOUNTER — HOSPITAL ENCOUNTER (OUTPATIENT)
Dept: RADIOLOGY | Facility: HOSPITAL | Age: 55
Discharge: HOME | End: 2025-05-19
Attending: STUDENT IN AN ORGANIZED HEALTH CARE EDUCATION/TRAINING PROGRAM
Payer: COMMERCIAL

## 2025-05-19 VITALS — WEIGHT: 218.2 LBS | BODY MASS INDEX: 28.92 KG/M2 | HEIGHT: 73 IN

## 2025-05-19 DIAGNOSIS — M25.512 BILATERAL SHOULDER PAIN, UNSPECIFIED CHRONICITY: ICD-10-CM

## 2025-05-19 DIAGNOSIS — M75.41 SUBACROMIAL IMPINGEMENT OF RIGHT SHOULDER: ICD-10-CM

## 2025-05-19 DIAGNOSIS — M75.42 SUBACROMIAL IMPINGEMENT OF LEFT SHOULDER: ICD-10-CM

## 2025-05-19 DIAGNOSIS — M25.511 BILATERAL SHOULDER PAIN, UNSPECIFIED CHRONICITY: ICD-10-CM

## 2025-05-19 DIAGNOSIS — M75.82 TENDONITIS OF BOTH ROTATOR CUFFS: Primary | ICD-10-CM

## 2025-05-19 DIAGNOSIS — M75.81 TENDONITIS OF BOTH ROTATOR CUFFS: Primary | ICD-10-CM

## 2025-05-19 PROCEDURE — 73030 X-RAY EXAM OF SHOULDER: CPT

## 2025-05-19 PROCEDURE — 99204 OFFICE O/P NEW MOD 45 MIN: CPT | Performed by: STUDENT IN AN ORGANIZED HEALTH CARE EDUCATION/TRAINING PROGRAM

## 2025-05-19 RX ORDER — MELOXICAM 15 MG/1
15 TABLET ORAL DAILY
Qty: 30 TABLET | Refills: 2 | Status: SHIPPED | OUTPATIENT
Start: 2025-05-19 | End: 2025-08-17

## 2025-05-19 NOTE — ASSESSMENT & PLAN NOTE
Discussed x ray and physical exam findings of the right shoulder at length with patient in the office today. Discussed physical exam findings consistent with bilateral rotator cuff tendinitis. Discussed conservative treatment with activity modification, RICE therapies, and oral anti-inflammatories as needed for persistent pain and inflammation. Also discussed physical therapy to work on strengthening of the muscles around the shoulder as well as continued progression of range of motion. Patient expressed understanding. Referral placed to PT today. Also discussed Meloxicam for persistent pain and inflammation of the right shoulder. Discussed side effects and risks of medication. Patient agreeable. Prescription and instruction for oral use provided to the patient today. Patient agreeable to the above therapies.  The will follow up in 6 weeks for reevaluation of the bilateral shoulder. If no improvement at this time, can consider MRI of the more symptomatic shoulder for further evaluation of symptoms. All of patients questions were answered in the office today.    Orders:    XR shoulder 2+ vw left; Future    XR shoulder 2+ vw right; Future    meloxicam (Mobic) 15 mg tablet; Take 1 tablet (15 mg total) by mouth daily    Ambulatory Referral to Physical Therapy; Future

## 2025-05-31 ENCOUNTER — APPOINTMENT (OUTPATIENT)
Dept: LAB | Facility: CLINIC | Age: 55
End: 2025-05-31
Attending: PHYSICIAN ASSISTANT
Payer: COMMERCIAL

## 2025-05-31 DIAGNOSIS — R97.20 ELEVATED PSA: ICD-10-CM

## 2025-05-31 LAB — PSA SERPL-MCNC: 5.75 NG/ML (ref 0–4)

## 2025-05-31 PROCEDURE — 84153 ASSAY OF PSA TOTAL: CPT

## 2025-05-31 PROCEDURE — 36415 COLL VENOUS BLD VENIPUNCTURE: CPT

## 2025-06-04 ENCOUNTER — OFFICE VISIT (OUTPATIENT)
Dept: UROLOGY | Facility: CLINIC | Age: 55
End: 2025-06-04
Payer: COMMERCIAL

## 2025-06-04 VITALS
SYSTOLIC BLOOD PRESSURE: 114 MMHG | OXYGEN SATURATION: 98 % | DIASTOLIC BLOOD PRESSURE: 72 MMHG | WEIGHT: 218 LBS | BODY MASS INDEX: 28.76 KG/M2 | HEART RATE: 95 BPM

## 2025-06-04 DIAGNOSIS — R97.20 ELEVATED PSA: Primary | ICD-10-CM

## 2025-06-04 PROCEDURE — 99213 OFFICE O/P EST LOW 20 MIN: CPT | Performed by: PHYSICIAN ASSISTANT

## 2025-06-04 NOTE — ASSESSMENT & PLAN NOTE
Orders:    PSA Total, Diagnostic; Future  His PSA has been stable  We will follow-up in 6 months with PSA prior to visit and repeat JANE

## 2025-07-10 ENCOUNTER — TELEPHONE (OUTPATIENT)
Dept: GASTROENTEROLOGY | Facility: MEDICAL CENTER | Age: 55
End: 2025-07-10

## 2025-07-10 NOTE — TELEPHONE ENCOUNTER
07/10/25  Screened by: Aramis Mitchell MA    Referring Provider     Pre- Screening:     There is no height or weight on file to calculate BMI.  Has patient been referred for a routine screening Colonoscopy? yes  Is the patient between 45-75 years old? yes      Previous Colonoscopy yes   If yes:    Date: 5/7/2018    Facility: [facility]    Reason: Screening      SCHEDULING STAFF: If the patient is between 45yrs-49yrs, please advise patient to confirm benefits/coverage with their insurance company for a routine screening colonoscopy, some insurance carriers will only cover at 50yrs or older. If the patient is over 75years old, please schedule an office visit.     Does the patient want to see a Gastroenterologist prior to their procedure OR are they having any GI symptoms? no    Has the patient been hospitalized or had abdominal surgery in the past 6 months? no    Does the patient use supplemental oxygen? no    Does the patient take Coumadin, Lovenox, Plavix, Elliquis, Xarelto, or other blood thinning medication? no    Has the patient had a stroke, cardiac event, or stent placed in the past year? no    SCHEDULING STAFF: If patient answers NO to above questions, then schedule procedure. If patient answers YES to above questions, then schedule office appointment.     If patient is between 45yrs - 49yrs, please advise patient that we will have to confirm benefits & coverage with their insurance company for a routine screening colonoscopy.

## 2025-07-10 NOTE — TELEPHONE ENCOUNTER
Patient was due to a repeat colonoscopy see telephone encounter 5/11/25    Procedure: Colonoscopy  Date: 8/25/25  Physician performing: Dr. Vazquez  Location of procedure:  [facility]  Instructions given to patient: Miralax  Diabetic: No  Clearances: N/A

## 2025-08-18 ENCOUNTER — TELEPHONE (OUTPATIENT)
Age: 55
End: 2025-08-18

## (undated) DEVICE — SYRINGE 10ML LL

## (undated) DEVICE — MAX-CORE® DISPOSABLE CORE BIOPSY INSTRUMENT, 18G X 25CM: Brand: MAX-CORE

## (undated) DEVICE — RAZOR DBLE EDGE SHAVER

## (undated) DEVICE — HEAVY DUTY TABLE COVER: Brand: CONVERTORS

## (undated) DEVICE — 3M™ IOBAN™ 2 ANTIMICROBIAL INCISE DRAPE 6640EZ: Brand: IOBAN™ 2

## (undated) DEVICE — UTILITY MARKER,BLACK WITH LABELS: Brand: DEVON

## (undated) DEVICE — ULTRASOUND GEL STERILE FOIL PK

## (undated) DEVICE — CHLORAPREP HI-LITE 26ML ORANGE

## (undated) DEVICE — GAUZE SPONGES,16 PLY: Brand: CURITY

## (undated) DEVICE — SYSTEM TRANSPERINEAL ACCESS PRECISIONPOINT

## (undated) DEVICE — NEEDLE 25G X 1 1/2

## (undated) DEVICE — PREP PAD BNS: Brand: CONVERTORS

## (undated) DEVICE — TELFA NON-ADHERENT ABSORBENT DRESSING: Brand: TELFA

## (undated) DEVICE — SPECIMEN CONTAINER STERILE PEEL PACK

## (undated) DEVICE — GLOVE SRG BIOGEL ECLIPSE 7.5